# Patient Record
Sex: MALE | Race: WHITE | URBAN - NONMETROPOLITAN AREA
[De-identification: names, ages, dates, MRNs, and addresses within clinical notes are randomized per-mention and may not be internally consistent; named-entity substitution may affect disease eponyms.]

---

## 2017-12-14 LAB
ALBUMIN SERPL-MCNC: 4.5 G/DL
ALP BLD-CCNC: 59 U/L
ALT SERPL-CCNC: 19 U/L
ANION GAP SERPL CALCULATED.3IONS-SCNC: 15 MMOL/L
AST SERPL-CCNC: 28 U/L
BASOPHILS ABSOLUTE: NORMAL /ΜL
BASOPHILS RELATIVE PERCENT: NORMAL %
BILIRUB SERPL-MCNC: 0.7 MG/DL (ref 0.1–1.4)
BUN BLDV-MCNC: 19 MG/DL
CALCIUM SERPL-MCNC: 9.5 MG/DL
CHLORIDE BLD-SCNC: 102 MMOL/L
CHOLESTEROL, TOTAL: 181 MG/DL
CHOLESTEROL/HDL RATIO: 2.7
CO2: 27 MMOL/L
CREAT SERPL-MCNC: 1 MG/DL
EOSINOPHILS ABSOLUTE: NORMAL /ΜL
EOSINOPHILS RELATIVE PERCENT: NORMAL %
GFR CALCULATED: 91
GLUCOSE BLD-MCNC: 91 MG/DL
HCT VFR BLD CALC: 49.9 % (ref 41–53)
HDLC SERPL-MCNC: 67 MG/DL (ref 35–70)
HEMOGLOBIN: 17 G/DL (ref 13.5–17.5)
LDL CHOLESTEROL CALCULATED: 95 MG/DL (ref 0–160)
LYMPHOCYTES ABSOLUTE: NORMAL /ΜL
LYMPHOCYTES RELATIVE PERCENT: NORMAL %
MCH RBC QN AUTO: NORMAL PG
MCHC RBC AUTO-ENTMCNC: NORMAL G/DL
MCV RBC AUTO: NORMAL FL
MONOCYTES ABSOLUTE: NORMAL /ΜL
MONOCYTES RELATIVE PERCENT: NORMAL %
NEUTROPHILS ABSOLUTE: NORMAL /ΜL
NEUTROPHILS RELATIVE PERCENT: NORMAL %
PLATELET # BLD: NORMAL K/ΜL
PMV BLD AUTO: NORMAL FL
POTASSIUM SERPL-SCNC: 4.3 MMOL/L
RBC # BLD: 5.49 10^6/ΜL
SODIUM BLD-SCNC: 140 MMOL/L
TOTAL PROTEIN: 6.8
TRIGL SERPL-MCNC: 94 MG/DL
VLDLC SERPL CALC-MCNC: 19 MG/DL
WBC # BLD: 6.2 10^3/ML

## 2017-12-20 ENCOUNTER — OFFICE VISIT (OUTPATIENT)
Dept: NEPHROLOGY | Age: 62
End: 2017-12-20
Payer: COMMERCIAL

## 2017-12-20 VITALS
SYSTOLIC BLOOD PRESSURE: 138 MMHG | HEIGHT: 66 IN | OXYGEN SATURATION: 94 % | WEIGHT: 215 LBS | DIASTOLIC BLOOD PRESSURE: 98 MMHG | HEART RATE: 79 BPM | RESPIRATION RATE: 18 BRPM | BODY MASS INDEX: 34.55 KG/M2

## 2017-12-20 DIAGNOSIS — N02.8 IGA NEPHROPATHY: ICD-10-CM

## 2017-12-20 DIAGNOSIS — N18.2 CKD (CHRONIC KIDNEY DISEASE), STAGE II: Primary | ICD-10-CM

## 2017-12-20 PROCEDURE — G8484 FLU IMMUNIZE NO ADMIN: HCPCS | Performed by: INTERNAL MEDICINE

## 2017-12-20 PROCEDURE — 3017F COLORECTAL CA SCREEN DOC REV: CPT | Performed by: INTERNAL MEDICINE

## 2017-12-20 PROCEDURE — G8417 CALC BMI ABV UP PARAM F/U: HCPCS | Performed by: INTERNAL MEDICINE

## 2017-12-20 PROCEDURE — 1036F TOBACCO NON-USER: CPT | Performed by: INTERNAL MEDICINE

## 2017-12-20 PROCEDURE — 99213 OFFICE O/P EST LOW 20 MIN: CPT | Performed by: INTERNAL MEDICINE

## 2017-12-20 PROCEDURE — G8427 DOCREV CUR MEDS BY ELIG CLIN: HCPCS | Performed by: INTERNAL MEDICINE

## 2017-12-20 NOTE — PROGRESS NOTES
[facility]    [address] [facility]  [address], [address]  [phone]       Progress Note    12/20/2017 11:16 AM    Pt Name:    Jenny Pa  YOB: 1955  Primary Care Physician:  Osmar Aguilar MD       Chief Complaint:   Chief Complaint   Patient presents with    Chronic Kidney Disease     ii        History of Chief Complaint: CKD stage II from biopsy proven IgA nephropathy      Subjective:  I last saw the patient in clinic 12/21/16. I follow the patient for Chronic Kidney disease stage II. Since our last visit the patient has not been hospitalized. The patient is sleeping well at night with 1-2 times per night nocturia. The patient has a good appetite and is remaining active. The patient denied N/V/C/D/SOB/CP. EGFR=91 Ml/Min       Objective:  VITALS:  BP (!) 138/98 (Site: Left Arm, Position: Sitting, Cuff Size: Small Adult) Comment: PT. checks bp at home and it is fine there. Pulse 79   Resp 18   Ht 5' 6\" (1.676 m)   Wt 215 lb (97.5 kg)   SpO2 94%   BMI 34.70 kg/m²   Weight:   Wt Readings from Last 3 Encounters:   12/20/17 215 lb (97.5 kg)   12/21/16 200 lb (90.7 kg)   04/25/16 201 lb 12.8 oz (91.5 kg)     Body mass index is 34.7 kg/m². Physical examination    General:  Alert and cooperative with exam  HEENT:  Head: Normocephalic, no lesions, without obvious abnormality. Neck:   No JVD and no bruits. Thyroid gland is normal  Lungs:  clear to auscultation bilaterally  Heart:  regular rate and rhythm, S1, S2 normal, no murmur, click, rub or gallop  Abdomen:  soft, non-tender; bowel sounds normal; no masses,  no organomegaly  Extremities:  extremities normal, atraumatic, no cyanosis or edema  Neurologic:  Mental status: Alert, oriented, thought content appropriate  Skin:                Warm and dry with no rashes. Muscles:         Hand  and leg strength are equal and strong bilaterally.      Lab Data      CBC:   Lab Results   Component Value Date WBC 6.2 12/14/2017    HGB 17.0 12/14/2017    HCT 49.9 12/14/2017    MCV 93.3 08/15/2013    PLT 32.4 11/30/2015     BMP:    Lab Results   Component Value Date     12/14/2017     12/06/2016     12/01/2015    K 4.3 12/14/2017    K 4.4 12/06/2016    K 5.0 12/01/2015     12/14/2017     12/06/2016     12/01/2015    CO2 27 12/14/2017    CO2 27 12/06/2016    CO2 30 12/01/2015    BUN 19 12/14/2017    BUN 25 12/06/2016    BUN 24 12/01/2015    CREATININE 1.0 12/14/2017    CREATININE 0.9 12/06/2016    CREATININE 1.0 12/01/2015    GLUCOSE 91 12/14/2017    GLUCOSE 102 12/06/2016    GLUCOSE 92 12/01/2015      Hepatic:   Lab Results   Component Value Date    AST 28 12/14/2017    ALT 19 12/14/2017    BILITOT 0.7 12/14/2017    ALKPHOS 59 12/14/2017     BNP: No results found for: BNP  Lipids:   Lab Results   Component Value Date    CHOL 181 12/14/2017    HDL 67 12/14/2017     INR: No results found for: INR  URINE: No results found for: NAUR, PROTUR  No results found for: NITRU, COLORU, PHUR, LABCAST, WBCUA, RBCUA, MUCUS, TRICHOMONAS, YEAST, BACTERIA, CLARITYU, SPECGRAV, LEUKOCYTESUR, UROBILINOGEN, BILIRUBINUR, BLOODU, GLUCOSEU, KETUA, AMORPHOUS   Microalbumen/Creatinine ratio:  No components found for: RUCREAT        Medications:    Current Outpatient Prescriptions   Medication Sig Dispense Refill    aspirin 81 MG tablet Take 81 mg by mouth daily      nabumetone (RELAFEN) 500 MG tablet Take 500 mg by mouth 2 times daily as needed for Pain      Fish Oil OIL by Does not apply route daily      balsalazide (COLAZAL) 750 MG capsule Take 750 mg by mouth 3 tablets twice daily      lovastatin (MEVACOR) 20 MG tablet Take 20 mg by mouth nightly.  therapeutic multivitamin-minerals (THERAGRAN-M) tablet Take 1 tablet by mouth daily.  lisinopril-hydrochlorothiazide (PRINZIDE;ZESTORETIC) 20-25 MG per tablet Take 1 tablet by mouth daily.        No current facility-administered medications for this visit.            IMPRESSIONS:      1.  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 IgA nephropathy  3. Proteinuia  4.  hematuria         PLAN:  1. We discussed the eGFR today. 2. We will continue all current medications without changes. 3. We will see the patient back in 12 months.               _________________________________  Ankit Pat.  [facility]      Candace Messina MD

## 2018-12-04 LAB
ALBUMIN SERPL-MCNC: 4.6 G/DL
ALP BLD-CCNC: 58 U/L
ALT SERPL-CCNC: 22 U/L
ANION GAP SERPL CALCULATED.3IONS-SCNC: 12 MMOL/L
AST SERPL-CCNC: 26 U/L
BASOPHILS ABSOLUTE: NORMAL /ΜL
BASOPHILS RELATIVE PERCENT: NORMAL %
BILIRUB SERPL-MCNC: 1 MG/DL (ref 0.1–1.4)
BUN BLDV-MCNC: 26 MG/DL
CALCIUM SERPL-MCNC: 9.8 MG/DL
CHLORIDE BLD-SCNC: 104 MMOL/L
CO2: 27 MMOL/L
CREAT SERPL-MCNC: 0.9 MG/DL
EOSINOPHILS ABSOLUTE: NORMAL /ΜL
EOSINOPHILS RELATIVE PERCENT: NORMAL %
GFR CALCULATED: 89.3
GLUCOSE BLD-MCNC: 103 MG/DL
HCT VFR BLD CALC: 48.5 % (ref 41–53)
HEMOGLOBIN: 17 G/DL (ref 13.5–17.5)
LYMPHOCYTES ABSOLUTE: NORMAL /ΜL
LYMPHOCYTES RELATIVE PERCENT: NORMAL %
MCH RBC QN AUTO: NORMAL PG
MCHC RBC AUTO-ENTMCNC: NORMAL G/DL
MCV RBC AUTO: NORMAL FL
MONOCYTES ABSOLUTE: NORMAL /ΜL
MONOCYTES RELATIVE PERCENT: NORMAL %
NEUTROPHILS ABSOLUTE: NORMAL /ΜL
NEUTROPHILS RELATIVE PERCENT: NORMAL %
PLATELET # BLD: 238 K/ΜL
PMV BLD AUTO: NORMAL FL
POTASSIUM SERPL-SCNC: 4.8 MMOL/L
RBC # BLD: 5.41 10^6/ΜL
SODIUM BLD-SCNC: 143 MMOL/L
T4 FREE: 1.45
TOTAL PROTEIN: 7.2
WBC # BLD: 5.2 10^3/ML

## 2018-12-19 ENCOUNTER — OFFICE VISIT (OUTPATIENT)
Dept: NEPHROLOGY | Age: 63
End: 2018-12-19
Payer: COMMERCIAL

## 2018-12-19 VITALS
SYSTOLIC BLOOD PRESSURE: 132 MMHG | RESPIRATION RATE: 18 BRPM | WEIGHT: 213 LBS | BODY MASS INDEX: 34.23 KG/M2 | HEART RATE: 63 BPM | OXYGEN SATURATION: 95 % | HEIGHT: 66 IN | DIASTOLIC BLOOD PRESSURE: 84 MMHG

## 2018-12-19 DIAGNOSIS — N18.30 CKD (CHRONIC KIDNEY DISEASE), STAGE III (HCC): Primary | ICD-10-CM

## 2018-12-19 PROCEDURE — 1036F TOBACCO NON-USER: CPT | Performed by: INTERNAL MEDICINE

## 2018-12-19 PROCEDURE — G8427 DOCREV CUR MEDS BY ELIG CLIN: HCPCS | Performed by: INTERNAL MEDICINE

## 2018-12-19 PROCEDURE — 1101F PT FALLS ASSESS-DOCD LE1/YR: CPT | Performed by: INTERNAL MEDICINE

## 2018-12-19 PROCEDURE — G8484 FLU IMMUNIZE NO ADMIN: HCPCS | Performed by: INTERNAL MEDICINE

## 2018-12-19 PROCEDURE — 1123F ACP DISCUSS/DSCN MKR DOCD: CPT | Performed by: INTERNAL MEDICINE

## 2018-12-19 PROCEDURE — 4040F PNEUMOC VAC/ADMIN/RCVD: CPT | Performed by: INTERNAL MEDICINE

## 2018-12-19 PROCEDURE — 3017F COLORECTAL CA SCREEN DOC REV: CPT | Performed by: INTERNAL MEDICINE

## 2018-12-19 PROCEDURE — 99213 OFFICE O/P EST LOW 20 MIN: CPT | Performed by: INTERNAL MEDICINE

## 2018-12-19 PROCEDURE — G8417 CALC BMI ABV UP PARAM F/U: HCPCS | Performed by: INTERNAL MEDICINE

## 2018-12-19 RX ORDER — TRAMADOL HYDROCHLORIDE 50 MG/1
50 TABLET ORAL PRN
COMMUNITY
Start: 2018-11-26 | End: 2021-04-05 | Stop reason: ALTCHOICE

## 2018-12-19 RX ORDER — MELOXICAM 7.5 MG/1
7.5 TABLET ORAL DAILY
COMMUNITY
Start: 2018-12-10 | End: 2021-04-05 | Stop reason: ALTCHOICE

## 2019-11-26 LAB
ABSOLUTE BASO #: 0.1 X10E9/L (ref 0–0.9)
ABSOLUTE EOS #: 0.3 X10E9/L (ref 0–0.4)
ABSOLUTE LYMPH #: 1.8 X10E9/L (ref 1–3.5)
ABSOLUTE MONO #: 0.7 X10E9/L (ref 0–0.9)
ABSOLUTE NEUT #: 4 X10E9/L (ref 1.5–6.6)
ANION GAP SERPL CALCULATED.3IONS-SCNC: 17 MMOL/L (ref 4–12)
BASOPHILS RELATIVE PERCENT: 0.9 %
BUN BLDV-MCNC: 19 MG/DL (ref 5–27)
CALCIUM SERPL-MCNC: 9.7 MG/DL (ref 8.5–10.5)
CHLORIDE BLD-SCNC: 101 MMOL/L (ref 98–109)
CO2: 24 MMOL/L (ref 22–32)
CREAT SERPL-MCNC: 0.98 MG/DL (ref 0.6–1.3)
EGFR AFRICAN AMERICAN: >60 ML/MIN/1.73SQ.M
EGFR IF NONAFRICAN AMERICAN: >60 ML/MIN/1.73SQ.M
EOSINOPHILS RELATIVE PERCENT: 3.7 %
GLUCOSE: 91 MG/DL (ref 65–99)
HCT VFR BLD CALC: 51.8 % (ref 37–51)
HEMOGLOBIN: 17.4 G/DL (ref 12.6–17.4)
LYMPHOCYTE %: 27.1 %
MCH RBC QN AUTO: 31.5 PG (ref 27–35)
MCHC RBC AUTO-ENTMCNC: 33.6 G/DL (ref 31–36)
MCV RBC AUTO: 94 FL (ref 81–101)
MONOCYTES # BLD: 9.7 %
NEUTROPHILS RELATIVE PERCENT: 58.6 %
PDW BLD-RTO: 14 % (ref 11.4–14.3)
PLATELETS: 317 X10E9/L (ref 150–450)
PMV BLD AUTO: 7.3 FL (ref 7–12)
POTASSIUM SERPL-SCNC: 4.2 MMOL/L (ref 3.5–5)
RBC: 5.54 X10E12/L (ref 3.9–5.8)
SODIUM BLD-SCNC: 142 MMOL/L (ref 134–146)
WBC: 6.8 X10E9/L (ref 4.4–12)

## 2019-12-18 ENCOUNTER — OFFICE VISIT (OUTPATIENT)
Dept: NEPHROLOGY | Age: 64
End: 2019-12-18
Payer: COMMERCIAL

## 2019-12-18 VITALS
WEIGHT: 225 LBS | SYSTOLIC BLOOD PRESSURE: 130 MMHG | OXYGEN SATURATION: 93 % | HEIGHT: 66 IN | RESPIRATION RATE: 18 BRPM | HEART RATE: 70 BPM | DIASTOLIC BLOOD PRESSURE: 80 MMHG | BODY MASS INDEX: 36.16 KG/M2

## 2019-12-18 DIAGNOSIS — N18.2 CKD (CHRONIC KIDNEY DISEASE), STAGE II: Primary | ICD-10-CM

## 2019-12-18 DIAGNOSIS — D80.2 IGA DEFICIENCY (HCC): ICD-10-CM

## 2019-12-18 PROCEDURE — G8427 DOCREV CUR MEDS BY ELIG CLIN: HCPCS | Performed by: INTERNAL MEDICINE

## 2019-12-18 PROCEDURE — 1036F TOBACCO NON-USER: CPT | Performed by: INTERNAL MEDICINE

## 2019-12-18 PROCEDURE — G8484 FLU IMMUNIZE NO ADMIN: HCPCS | Performed by: INTERNAL MEDICINE

## 2019-12-18 PROCEDURE — G8417 CALC BMI ABV UP PARAM F/U: HCPCS | Performed by: INTERNAL MEDICINE

## 2019-12-18 PROCEDURE — 3017F COLORECTAL CA SCREEN DOC REV: CPT | Performed by: INTERNAL MEDICINE

## 2019-12-18 PROCEDURE — 1123F ACP DISCUSS/DSCN MKR DOCD: CPT | Performed by: INTERNAL MEDICINE

## 2019-12-18 PROCEDURE — 99213 OFFICE O/P EST LOW 20 MIN: CPT | Performed by: INTERNAL MEDICINE

## 2019-12-18 PROCEDURE — 4040F PNEUMOC VAC/ADMIN/RCVD: CPT | Performed by: INTERNAL MEDICINE

## 2020-12-10 LAB
ALBUMIN SERPL-MCNC: 4.4 G/DL
ALP BLD-CCNC: 70 U/L
ALT SERPL-CCNC: 17 U/L
ANION GAP SERPL CALCULATED.3IONS-SCNC: 10 MMOL/L
AST SERPL-CCNC: 24 U/L
BASOPHILS ABSOLUTE: NORMAL
BASOPHILS RELATIVE PERCENT: NORMAL
BILIRUB SERPL-MCNC: 0.7 MG/DL (ref 0.1–1.4)
BUN BLDV-MCNC: 20 MG/DL
CALCIUM SERPL-MCNC: 9.7 MG/DL
CHLORIDE BLD-SCNC: 102 MMOL/L
CO2: 28 MMOL/L
CREAT SERPL-MCNC: 0.87 MG/DL
EOSINOPHILS ABSOLUTE: NORMAL
EOSINOPHILS RELATIVE PERCENT: NORMAL
GFR CALCULATED: 60
GLUCOSE BLD-MCNC: 100 MG/DL
HCT VFR BLD CALC: 51.2 % (ref 41–53)
HEMOGLOBIN: 16.9 G/DL (ref 13.5–17.5)
LYMPHOCYTES ABSOLUTE: NORMAL
LYMPHOCYTES RELATIVE PERCENT: NORMAL
MCH RBC QN AUTO: NORMAL PG
MCHC RBC AUTO-ENTMCNC: NORMAL G/DL
MCV RBC AUTO: NORMAL FL
MONOCYTES ABSOLUTE: NORMAL
MONOCYTES RELATIVE PERCENT: NORMAL
NEUTROPHILS ABSOLUTE: NORMAL
NEUTROPHILS RELATIVE PERCENT: NORMAL
PLATELET # BLD: 255 K/ΜL
PMV BLD AUTO: NORMAL FL
POTASSIUM SERPL-SCNC: 4.6 MMOL/L
RBC # BLD: 5.46 10^6/ΜL
SODIUM BLD-SCNC: 140 MMOL/L
TOTAL PROTEIN: 7.1
WBC # BLD: 5.6 10^3/ML

## 2020-12-14 LAB
BILIRUBIN, URINE: NEGATIVE
BLOOD, URINE: NEGATIVE
CLARITY: CLEAR
COLOR: YELLOW
GLUCOSE URINE: NEGATIVE
KETONES, URINE: NEGATIVE
LEUKOCYTE ESTERASE, URINE: NEGATIVE
NITRITE, URINE: NEGATIVE
PH UA: 6.5 (ref 4.5–8)
PROTEIN UA: NEGATIVE
SPECIFIC GRAVITY, URINE: 1.02
UROBILINOGEN, URINE: NORMAL

## 2021-01-04 ENCOUNTER — OFFICE VISIT (OUTPATIENT)
Dept: NEPHROLOGY | Age: 66
End: 2021-01-04
Payer: COMMERCIAL

## 2021-01-04 VITALS
HEIGHT: 66 IN | RESPIRATION RATE: 18 BRPM | BODY MASS INDEX: 34.55 KG/M2 | WEIGHT: 215 LBS | SYSTOLIC BLOOD PRESSURE: 158 MMHG | DIASTOLIC BLOOD PRESSURE: 88 MMHG

## 2021-01-04 DIAGNOSIS — N18.2 CKD (CHRONIC KIDNEY DISEASE), STAGE II: Primary | ICD-10-CM

## 2021-01-04 PROBLEM — I78.1 NEVUS, NON-NEOPLASTIC: Status: ACTIVE | Noted: 2017-02-01

## 2021-01-04 PROBLEM — L72.3 SEBACEOUS CYST: Status: ACTIVE | Noted: 2017-02-01

## 2021-01-04 PROBLEM — L57.0 ACTINIC KERATOSIS: Status: ACTIVE | Noted: 2017-02-01

## 2021-01-04 PROCEDURE — G8417 CALC BMI ABV UP PARAM F/U: HCPCS | Performed by: INTERNAL MEDICINE

## 2021-01-04 PROCEDURE — G8482 FLU IMMUNIZE ORDER/ADMIN: HCPCS | Performed by: INTERNAL MEDICINE

## 2021-01-04 PROCEDURE — 1123F ACP DISCUSS/DSCN MKR DOCD: CPT | Performed by: INTERNAL MEDICINE

## 2021-01-04 PROCEDURE — G8428 CUR MEDS NOT DOCUMENT: HCPCS | Performed by: INTERNAL MEDICINE

## 2021-01-04 PROCEDURE — 99214 OFFICE O/P EST MOD 30 MIN: CPT | Performed by: INTERNAL MEDICINE

## 2021-01-04 PROCEDURE — 4040F PNEUMOC VAC/ADMIN/RCVD: CPT | Performed by: INTERNAL MEDICINE

## 2021-01-04 PROCEDURE — 3017F COLORECTAL CA SCREEN DOC REV: CPT | Performed by: INTERNAL MEDICINE

## 2021-01-04 PROCEDURE — 1036F TOBACCO NON-USER: CPT | Performed by: INTERNAL MEDICINE

## 2021-01-04 NOTE — PATIENT INSTRUCTIONS
KNOW YOUR KIDNEY NUMBERS    Your kidney speed (eGFR) was 60 ml/min this visit (normal is  ml/min)(Ml/min=milliliters of blood filtered per minute). The higher this number is, the better your kidney function is. Your serum creatinine was 0.87 (normal 0.8-1.2 mg/dl at most labs). The higher this number is, the worse your kidney function is. You are in stage G-II-A1 of chronic kidney disease. Your kidney function has declined as compared to your last visit. Your last eGFR was  90 Ml/Min. Stages of kidney disease    EGFR (estimated glomerular filtration rate)    G-I > 90 ml/min Kidney damage with normal kidney function (blood or protein in the urine)  G-II 60-89 ml/min Normal kidney function with mild damage with or without blood or protein in the urine  G-IIIA 45-59 ml/min Mild to moderate loss of kidney function. G-IIIB 30-44 ml/min Moderate to severe loss of kidney function  G-IV 15-29 ml/min Severe loss of kidney function  G-V < 15 mlmin     May need dialysis or kidney transplant    ACR (urine albumin/creatinine ratio) (Mg/Gm)    A-1      ACR<30 Normal to mildly increased protein in the urine. A-2 ACR  Moderate increase in urine protein loss. A-3 ACR >300 Severe increase in urine protein loss    Our goal is to keep your eGFR going as fast as possible ( ml/min is normal). If your eGFR declines to 15-24 ml/min and stays there without recovery,  we will begin to educate you about dialysis or kidney transplant. We also want to keep the protein in your urine as low as possible. The leading cause of kidney disease in the world is diabetes mellitus. Keep your sugar  as much as possible. The second leading cause is hypertension. Keep Your blood pressure 120-140/70-80 as much as possible. If you need refills, call the office or your drug store. You may call the office any time with any questions or concerns. DUE TO THE CORONAVIRUS CONCERN, PLEASE LIMIT YOUR TIME IN PUBLIC. 8 Virgen Recinosidi YOUR HANDS COMPLETELY AND FREQUENTLY. Burgess Tisha Penaloza Nip

## 2021-01-04 NOTE — PROGRESS NOTES
[facility]    [address] [facility] [address]  [address], [address]  [phone]       Progress Note    1/4/2021 1:16 PM    Pt Name:    Katie Doss:    1955  Primary Care Physician:  Nic Mooney MD       Chief Complaint:   Chief Complaint   Patient presents with    Chronic Kidney Disease     biopsy proven IgA since 2013    Nephritis    Proteinuria        History of Chief Complaint: CKD stage G-II-A1 from biopsy proven IgA nephropathy. I first saw him 06/13/13. Subjective:  I last saw the patient in clinic 12/18/2019. I follow the patient for Chronic Kidney disease stage G-II-A1. Since our last visit the patient has not been hospitalized. The patient is sleeping well at night with 1-2 times per night nocturia. The patient has a good appetite and is remaining active. The patient denied N/V/C/D/SOB/CP. He has no trouble at bladder emptying. His urinalysis was normal.     COVID-19 screening  Fever: none  Cough: none  Exposure: none  Shortness of breath: none    Protein/creatinine ratio: 0.07  eGFR: >60 Ml/min      Last six eGFR readings:  Lab Results   Component Value Date    LABGLOM 60 12/10/2020    LABGLOM 89.3 12/04/2018    LABGLOM 91 12/14/2017    LABGLOM 103 12/06/2016    LABGLOM 76 12/01/2015          Objective:  VITALS:  BP (!) 158/88 (Site: Left Upper Arm, Position: Sitting, Cuff Size: Large Adult)   Resp 18   Ht 5' 6\" (1.676 m)   Wt 215 lb (97.5 kg)   BMI 34.70 kg/m²   Weight:   Wt Readings from Last 3 Encounters:   01/04/21 215 lb (97.5 kg)   12/18/19 225 lb (102.1 kg)   12/19/18 213 lb (96.6 kg)     Body mass index is 34.7 kg/m². Physical examination    General:  Alert and cooperative with exam  HEENT:  Normocephalic. No lesions nor rashes. YUKO. EOMI  Neck:   No JVD and no bruits. Thyroid gland is normal  Lungs:  Breathing easily. No rales nor rhonchi. No cough nor sputum production. Heart[de-identified]            RRR. No murmurs nor rubs. PMI is not enlarged nor displaced. Abdomen:  Soft and non tender. Bowel sounds are active in all four quadrants. Extremities:  no edema  Neurologic:  CN II-XII are intact. No deficits noted. Muscle strength and tone are equal throughout. Skin:                Warm and dry with no rashes. Muscles:         Hand  and leg strength are equal and strong bilaterally.      Lab Data      CBC:   Lab Results   Component Value Date    WBC 5.6 12/10/2020    HGB 16.9 12/10/2020    HCT 51.2 12/10/2020    MCV 94 11/25/2019     12/10/2020     BMP:    Lab Results   Component Value Date     12/10/2020     11/25/2019     12/04/2018    K 4.6 12/10/2020    K 4.2 11/25/2019    K 4.8 12/04/2018     12/10/2020     11/25/2019     12/04/2018    CO2 28 12/10/2020    CO2 24 11/25/2019    CO2 27 12/04/2018    BUN 20 12/10/2020    BUN 19 11/25/2019    BUN 26 12/04/2018    CREATININE 0.87 12/10/2020    CREATININE 0.98 11/25/2019    CREATININE 0.9 12/04/2018    GLUCOSE 100 12/10/2020    GLUCOSE 91 11/25/2019    GLUCOSE 103 12/04/2018      Hepatic:   Lab Results   Component Value Date    AST 24 12/10/2020    AST 26 12/04/2018    AST 28 12/14/2017    ALT 17 12/10/2020    ALT 22 12/04/2018    ALT 19 12/14/2017    BILITOT 0.7 12/10/2020    BILITOT 1.0 12/04/2018    BILITOT 0.7 12/14/2017    ALKPHOS 70 12/10/2020    ALKPHOS 58 12/04/2018    ALKPHOS 59 12/14/2017     BNP: No results found for: BNP  Lipids:   Lab Results   Component Value Date    CHOL 181 12/14/2017    HDL 67 12/14/2017     INR: No results found for: INR  URINE: No results found for: NAUR, PROTUR  Lab Results   Component Value Date    NITRU Negative 12/14/2020    COLORU Yellow 12/14/2020    PHUR 6.5 12/14/2020    CLARITYU Clear 12/14/2020    LEUKOCYTESUR Negative 12/14/2020    UROBILINOGEN  12/14/2020      Comment:      <1.1    BILIRUBINUR Negative 12/14/2020    BLOODU Negative 12/14/2020 GLUCOSEU negative 12/14/2020    KETUA Negative 12/14/2020      Microalbumen/Creatinine ratio:  No components found for: RUCREAT        Medications:    Current Outpatient Medications   Medication Sig Dispense Refill    meloxicam (MOBIC) 7.5 MG tablet 7.5 mg daily      traMADol (ULTRAM) 50 MG tablet 50 mg as needed. [place] aspirin 81 MG tablet Take 81 mg by mouth daily      Fish Oil OIL by Does not apply route daily      balsalazide (COLAZAL) 750 MG capsule Take 750 mg by mouth 3 tablets twice daily      lovastatin (MEVACOR) 20 MG tablet Take 20 mg by mouth nightly.  therapeutic multivitamin-minerals (THERAGRAN-M) tablet Take 1 tablet by mouth daily.  lisinopril-hydrochlorothiazide (PRINZIDE;ZESTORETIC) 20-25 MG per tablet Take 1 tablet by mouth daily. No current facility-administered medications for this visit. IMPRESSIONS:      Kidney disease: CKD stage G-II-A1 from biopsy proven IgA nephropathy since 2013  Anemia:  Bone and mineral metabolism:       PLAN:  1. We discussed the eGFR today. 2. We will continue all current medications without changes. 3. Checking protein/creatinine ratio. 4. Urinalysis  5. Since his eGFR dropped from 90-60 ml/min since last visit, I will see him back in 3 months. 6. We will see the patient back in 3 months.       _________________________________  Danny Flannery.  [facility]      Dany Sanchez MD

## 2021-03-24 LAB
BASOPHILS ABSOLUTE: 0.05 /ΜL
BASOPHILS RELATIVE PERCENT: 0.9 %
BUN BLDV-MCNC: 16 MG/DL
CALCIUM SERPL-MCNC: 9.1 MG/DL
CHLORIDE BLD-SCNC: 108 MMOL/L
CO2: 29 MMOL/L
CREAT SERPL-MCNC: 0.92 MG/DL
EOSINOPHILS ABSOLUTE: 0.27 /ΜL
EOSINOPHILS RELATIVE PERCENT: 5 %
GFR CALCULATED: 86
GLUCOSE BLD-MCNC: 113 MG/DL
HCT VFR BLD CALC: 49.6 % (ref 41–53)
HEMOGLOBIN: 16.7 G/DL (ref 13.5–17.5)
LYMPHOCYTES ABSOLUTE: 1.51 /ΜL
LYMPHOCYTES RELATIVE PERCENT: 27.7 %
MCH RBC QN AUTO: 31.1 PG
MCHC RBC AUTO-ENTMCNC: 33.7 G/DL
MCV RBC AUTO: 92.4 FL
MONOCYTES ABSOLUTE: 0.63 /ΜL
MONOCYTES RELATIVE PERCENT: 11.6 %
NEUTROPHILS ABSOLUTE: 2.97 /ΜL
NEUTROPHILS RELATIVE PERCENT: 54.4 %
PLATELET # BLD: 228 K/ΜL
PMV BLD AUTO: 8.5 FL
POTASSIUM SERPL-SCNC: 4.4 MMOL/L
RBC # BLD: 5.37 10^6/ΜL
SODIUM BLD-SCNC: 141 MMOL/L
WBC # BLD: 5.45 10^3/ML

## 2021-04-05 ENCOUNTER — OFFICE VISIT (OUTPATIENT)
Dept: NEPHROLOGY | Age: 66
End: 2021-04-05
Payer: COMMERCIAL

## 2021-04-05 VITALS
BODY MASS INDEX: 35.36 KG/M2 | WEIGHT: 220 LBS | HEIGHT: 66 IN | DIASTOLIC BLOOD PRESSURE: 84 MMHG | OXYGEN SATURATION: 95 % | RESPIRATION RATE: 18 BRPM | SYSTOLIC BLOOD PRESSURE: 120 MMHG | HEART RATE: 80 BPM

## 2021-04-05 DIAGNOSIS — N02.8 IGA NEPHROPATHY DETERMINED BY BIOPSY OF KIDNEY: ICD-10-CM

## 2021-04-05 DIAGNOSIS — N18.2 CKD (CHRONIC KIDNEY DISEASE), STAGE II: Primary | ICD-10-CM

## 2021-04-05 PROCEDURE — 99214 OFFICE O/P EST MOD 30 MIN: CPT | Performed by: INTERNAL MEDICINE

## 2021-04-05 PROCEDURE — 4040F PNEUMOC VAC/ADMIN/RCVD: CPT | Performed by: INTERNAL MEDICINE

## 2021-04-05 PROCEDURE — 1123F ACP DISCUSS/DSCN MKR DOCD: CPT | Performed by: INTERNAL MEDICINE

## 2021-04-05 PROCEDURE — 1036F TOBACCO NON-USER: CPT | Performed by: INTERNAL MEDICINE

## 2021-04-05 PROCEDURE — G8417 CALC BMI ABV UP PARAM F/U: HCPCS | Performed by: INTERNAL MEDICINE

## 2021-04-05 PROCEDURE — G8427 DOCREV CUR MEDS BY ELIG CLIN: HCPCS | Performed by: INTERNAL MEDICINE

## 2021-04-05 PROCEDURE — 3017F COLORECTAL CA SCREEN DOC REV: CPT | Performed by: INTERNAL MEDICINE

## 2021-04-05 RX ORDER — SULINDAC 150 MG/1
150 TABLET ORAL 2 TIMES DAILY
COMMUNITY

## 2021-04-05 NOTE — PROGRESS NOTES
[facility]    [address] [facility]  [address], [address]  [phone]       Progress Note    4/5/2021 1:24 PM    Pt Name:    Linda Henao  YOB: 1955  Primary Care Physician:  Bee Triana MD       Chief Complaint:   Chief Complaint   Patient presents with    Hypertension    Nephritis    Hematuria    Chronic Kidney Disease     biopsy proven IgA since 06/13/13        History of Chief Complaint: CKD stage G-II-A1 from biopsy proven IgA nephropathy. I first saw him 06/13/13. Subjective:  I last saw the patient in clinic 01/04/2021. I follow the patient for Chronic Kidney disease stage G-IIIA-A1. Since our last visit the patient has not been hospitalized. The patient is sleeping well at night with 0-1 times per night nocturia. The patient has a good appetite and is remaining active. The patient denied N/V/C/D/SOB/CP. He has no trouble at bladder emptying. COVID-19 screening  Fever: none  Cough: none  Exposure: none  Shortness of breath: none    Protein/creatinine ratio:   eGFR: 86 Ml/min (it was >60 Ml/Min last visit)  SCr: 0.92 Mg/Dl (It was 0.87 Mg/Dl last visit)      Last six eGFR readings:  Lab Results   Component Value Date    LABGLOM 86 03/24/2021    LABGLOM 60 12/10/2020    LABGLOM 89.3 12/04/2018    LABGLOM 91 12/14/2017    LABGLOM 103 12/06/2016    LABGLOM 76 12/01/2015          Objective:  VITALS:  /84 (Site: Left Upper Arm, Position: Sitting, Cuff Size: Large Adult)   Pulse 80   Resp 18   Ht 5' 6\" (1.676 m)   Wt 220 lb (99.8 kg)   SpO2 95%   BMI 35.51 kg/m²   Weight:   Wt Readings from Last 3 Encounters:   04/05/21 220 lb (99.8 kg)   01/04/21 215 lb (97.5 kg)   12/18/19 225 lb (102.1 kg)     Body mass index is 35.51 kg/m². Physical examination    General:  Alert and cooperative with exam  HEENT:  Normocephalic. No lesions nor rashes. YUKO. EOMI  Neck:   No JVD and no bruits. Thyroid gland is normal  Lungs:  Breathing easily.  No rales

## 2021-11-18 LAB
ALBUMIN SERPL-MCNC: 4.3 G/DL
ALP BLD-CCNC: 66 U/L
ALT SERPL-CCNC: 18 U/L
ANION GAP SERPL CALCULATED.3IONS-SCNC: 10 MMOL/L
AST SERPL-CCNC: 23 U/L
BASOPHILS ABSOLUTE: NORMAL
BASOPHILS RELATIVE PERCENT: NORMAL
BILIRUB SERPL-MCNC: 1.3 MG/DL (ref 0.1–1.4)
BILIRUBIN, URINE: NEGATIVE
BLOOD, URINE: NEGATIVE
BUN BLDV-MCNC: 21 MG/DL
CALCIUM SERPL-MCNC: 9.5 MG/DL
CHLORIDE BLD-SCNC: 102 MMOL/L
CHOLESTEROL, TOTAL: 162 MG/DL
CHOLESTEROL/HDL RATIO: 2.8
CLARITY: CLEAR
CO2: 28 MMOL/L
COLOR: YELLOW
CREAT SERPL-MCNC: 1.01 MG/DL
CREATININE, URINE: 111.21
EOSINOPHILS ABSOLUTE: NORMAL
EOSINOPHILS RELATIVE PERCENT: NORMAL
GFR CALCULATED: >60
GLUCOSE BLD-MCNC: 105 MG/DL
GLUCOSE URINE: NEGATIVE
HCT VFR BLD CALC: 50 % (ref 41–53)
HDLC SERPL-MCNC: 58 MG/DL (ref 35–70)
HEMOGLOBIN: 17.1 G/DL (ref 13.5–17.5)
KETONES, URINE: NEGATIVE
LDL CHOLESTEROL CALCULATED: 75 MG/DL (ref 0–160)
LEUKOCYTE ESTERASE, URINE: NEGATIVE
LYMPHOCYTES ABSOLUTE: NORMAL
LYMPHOCYTES RELATIVE PERCENT: NORMAL
MCH RBC QN AUTO: NORMAL PG
MCHC RBC AUTO-ENTMCNC: NORMAL G/DL
MCV RBC AUTO: NORMAL FL
MICROALBUMIN/CREAT 24H UR: <0.7 MG/G{CREAT}
MICROALBUMIN/CREAT UR-RTO: NORMAL
MONOCYTES ABSOLUTE: NORMAL
MONOCYTES RELATIVE PERCENT: NORMAL
NEUTROPHILS ABSOLUTE: NORMAL
NEUTROPHILS RELATIVE PERCENT: NORMAL
NITRITE, URINE: NEGATIVE
NONHDLC SERPL-MCNC: NORMAL MG/DL
PH UA: 7 (ref 4.5–8)
PLATELET # BLD: 263 K/ΜL
PMV BLD AUTO: NORMAL FL
POTASSIUM SERPL-SCNC: 4.7 MMOL/L
PROTEIN UA: NEGATIVE
RBC # BLD: 5.35 10^6/ΜL
SODIUM BLD-SCNC: 140 MMOL/L
SPECIFIC GRAVITY, URINE: 1.02
TOTAL PROTEIN: 6.8
TRIGL SERPL-MCNC: 146 MG/DL
TSH SERPL DL<=0.05 MIU/L-ACNC: 1.02 UIU/ML
UROBILINOGEN, URINE: NORMAL
VLDLC SERPL CALC-MCNC: 29 MG/DL
WBC # BLD: 6 10^3/ML

## 2021-12-01 ENCOUNTER — OFFICE VISIT (OUTPATIENT)
Dept: NEPHROLOGY | Age: 66
End: 2021-12-01
Payer: COMMERCIAL

## 2021-12-01 VITALS
OXYGEN SATURATION: 98 % | SYSTOLIC BLOOD PRESSURE: 138 MMHG | DIASTOLIC BLOOD PRESSURE: 78 MMHG | BODY MASS INDEX: 35.51 KG/M2 | HEART RATE: 75 BPM | WEIGHT: 220 LBS

## 2021-12-01 DIAGNOSIS — N02.8 IGA NEPHROPATHY DETERMINED BY BIOPSY OF KIDNEY: Primary | ICD-10-CM

## 2021-12-01 DIAGNOSIS — I10 HTN (HYPERTENSION), BENIGN: ICD-10-CM

## 2021-12-01 DIAGNOSIS — N18.2 CKD (CHRONIC KIDNEY DISEASE), STAGE II: ICD-10-CM

## 2021-12-01 PROCEDURE — 1123F ACP DISCUSS/DSCN MKR DOCD: CPT | Performed by: INTERNAL MEDICINE

## 2021-12-01 PROCEDURE — 99213 OFFICE O/P EST LOW 20 MIN: CPT | Performed by: INTERNAL MEDICINE

## 2021-12-01 PROCEDURE — 4040F PNEUMOC VAC/ADMIN/RCVD: CPT | Performed by: INTERNAL MEDICINE

## 2021-12-01 PROCEDURE — G8484 FLU IMMUNIZE NO ADMIN: HCPCS | Performed by: INTERNAL MEDICINE

## 2021-12-01 PROCEDURE — G8417 CALC BMI ABV UP PARAM F/U: HCPCS | Performed by: INTERNAL MEDICINE

## 2021-12-01 PROCEDURE — 3017F COLORECTAL CA SCREEN DOC REV: CPT | Performed by: INTERNAL MEDICINE

## 2021-12-01 PROCEDURE — 1036F TOBACCO NON-USER: CPT | Performed by: INTERNAL MEDICINE

## 2021-12-01 PROCEDURE — G8427 DOCREV CUR MEDS BY ELIG CLIN: HCPCS | Performed by: INTERNAL MEDICINE

## 2021-12-01 NOTE — PROGRESS NOTES
[facility]        Outpatient Follow-Up note         12/1/2021 10:13 AM    Patient Name:   Rhett Sage  YOB: 1955  Primary Care Physician:  Tatum Gomez MD   [address] [address]  Dept: [phone]  Loc: [phone]     Chief Complaint / Reason for follow-up : Follow Up of HTN/CKD /IgA Nephropathy     Interval History :  Patient seen and examined by me. Feel well. No cp or SOB. No hospitalizations and no med changes. Used to see DR. Chandra     Past History :  Past Medical History:   Diagnosis Date    Arthritis, degenerative     Hematuria 6/3/2013    HTN (hypertension)     Hyperglycemia     Hyperlipemia     IgA nephropathy 10/7/2013    Dr. Juana Hess    Knee joint replacement status-left 5/1/1996    Knee joint replacement status-left 6/15/1999    S/P cholecystectomy 6/3/2013    Snoring     no apnea, no waking cough or choking, no daytime somnolence, no am headaches, BMI 31, neck circ 15 inches    Ulcerative proctitis, chronic ([address]     White coat hypertension 12/16/2015     Past Surgical History:   Procedure Laterality Date    CARPAL TUNNEL RELEASE Bilateral 2010 ? [address]?     [facility]    COLONOSCOPY  2011    KNEE ARTHROPLASTY Left 1996 & 1998    Revision done in [address] [place] [address] ARTHROSCOPY Left late 1980's - 1999 X 3    OTHER SURGICAL HISTORY Right 04/23/2016    I&D, Poly Exchange and Closure of Right Total Knee     TIBIA OSTEOTOMY  late 1980's - 1999        Medications :     Outpatient Medications Marked as Taking for the 12/1/21 encounter (Office Visit) with Benita Magallanes MD   Medication Sig Dispense Refill    sulindac (CLINORIL) 150 MG tablet Take 150 mg by mouth 2 times daily      aspirin 81 MG tablet Take 81 mg by mouth daily      Fish Oil OIL by Does not apply route daily      balsalazide (COLAZAL) 750 MG capsule Take 750 mg by mouth 3 tablets twice daily      lovastatin (MEVACOR) 20 MG tablet Take 20 mg by mouth nightly.  therapeutic multivitamin-minerals (THERAGRAN-M) tablet Take 1 tablet by mouth daily.  lisinopril-hydrochlorothiazide (PRINZIDE;ZESTORETIC) 20-25 MG per tablet Take 1 tablet by mouth daily. Vitals     /78 (Site: Right Upper Arm, Position: Sitting, Cuff Size: Medium Adult)   Pulse 75   Wt 220 lb (99.8 kg) Comment: per patient  SpO2 98%   BMI 35.51 kg/m²  Wt Readings from Last 3 Encounters:   12/01/21 220 lb (99.8 kg)   04/05/21 220 lb (99.8 kg)   01/04/21 215 lb (97.5 kg)        Physical Exam     General -- no distress  Lungs -- clear  Heart -- S1, S2 heard, JVD - no  Abdomen - soft, non-tender  Extremities -- no edema  CNS - awake and alert    Labs, Radiology and Tests    Labs -    11/21           Potassium 4.7           BUN 21           Creatinine 1.01           eGFR >60                       UPCR            UMCR < 30                         Renal Ultrasound Scan --2013  Right kidney 9.3 cm left kidney 10.5 cm     Renal biopsy 2013-IgA nephropathy mild interstitial fibrosis around 25%. Assessment    1. Renal -patient is CKD stage II mostly due to IgA nephropathy biopsy-proven. -No further intervention at this time as the renal function is maintaining stable no hematuria and no proteinuria  -However if any of these worsen in future may need a repeat biopsy  He is already on ACE inhibitor. 2. Essential hypertension running well continue current medications  3. Hx of biopsy-proven IgA nephropathy-on ACE  4. meds reviewed and D/W patient  5. Discussed that the patient follow-up in 1 year    Tests and orders placed this Encounter   No orders of the defined types were placed in this encounter. Mariangel Ornelas M.D  [facility].

## 2022-12-07 ENCOUNTER — OFFICE VISIT (OUTPATIENT)
Dept: NEPHROLOGY | Age: 67
End: 2022-12-07
Payer: COMMERCIAL

## 2022-12-07 VITALS
SYSTOLIC BLOOD PRESSURE: 142 MMHG | OXYGEN SATURATION: 95 % | HEART RATE: 65 BPM | WEIGHT: 231 LBS | DIASTOLIC BLOOD PRESSURE: 90 MMHG | BODY MASS INDEX: 37.28 KG/M2

## 2022-12-07 DIAGNOSIS — N02.8 IGA NEPHROPATHY DETERMINED BY BIOPSY OF KIDNEY: Primary | ICD-10-CM

## 2022-12-07 DIAGNOSIS — N18.2 CKD (CHRONIC KIDNEY DISEASE), STAGE II: ICD-10-CM

## 2022-12-07 DIAGNOSIS — I10 HTN (HYPERTENSION), BENIGN: ICD-10-CM

## 2022-12-07 PROCEDURE — G8417 CALC BMI ABV UP PARAM F/U: HCPCS | Performed by: INTERNAL MEDICINE

## 2022-12-07 PROCEDURE — 3017F COLORECTAL CA SCREEN DOC REV: CPT | Performed by: INTERNAL MEDICINE

## 2022-12-07 PROCEDURE — 1036F TOBACCO NON-USER: CPT | Performed by: INTERNAL MEDICINE

## 2022-12-07 PROCEDURE — 3074F SYST BP LT 130 MM HG: CPT | Performed by: INTERNAL MEDICINE

## 2022-12-07 PROCEDURE — G8427 DOCREV CUR MEDS BY ELIG CLIN: HCPCS | Performed by: INTERNAL MEDICINE

## 2022-12-07 PROCEDURE — 3078F DIAST BP <80 MM HG: CPT | Performed by: INTERNAL MEDICINE

## 2022-12-07 PROCEDURE — 99213 OFFICE O/P EST LOW 20 MIN: CPT | Performed by: INTERNAL MEDICINE

## 2022-12-07 PROCEDURE — G8484 FLU IMMUNIZE NO ADMIN: HCPCS | Performed by: INTERNAL MEDICINE

## 2022-12-07 PROCEDURE — 1123F ACP DISCUSS/DSCN MKR DOCD: CPT | Performed by: INTERNAL MEDICINE

## 2022-12-07 NOTE — PROGRESS NOTES
[facility]        Outpatient Follow-Up note         12/7/2022 11:40 AM    Patient Name:   Aurelio Delgadillo  YOB: 1955  Primary Care Physician:  Michelle Robin MD   [id number] [place] [address]  Dept: [phone]  Loc: [phone]     Chief Complaint / Reason for follow-up : Follow Up of HTN/CKD /IgA Nephropathy     Interval History :  Patient seen and examined by me. Feel well. No cp or SOB. No hospitalizations and no med changes. Past History :  Past Medical History:   Diagnosis Date    Arthritis, degenerative     Hematuria 6/3/2013    HTN (hypertension)     Hyperglycemia     Hyperlipemia     IgA nephropathy 10/7/2013    Dr. Brandon Coronado    Knee joint replacement status-left 5/1/1996    Knee joint replacement status-left 6/15/1999    S/P cholecystectomy 6/3/2013    Snoring     no apnea, no waking cough or choking, no daytime somnolence, no am headaches, BMI 31, neck circ 15 inches    Ulcerative proctitis, chronic (HCC)     White coat hypertension 12/16/2015     Past Surgical History:   Procedure Laterality Date    CARPAL TUNNEL RELEASE Bilateral 2010 ? CHOLECYSTECTOMY  1991?     [facility]    COLONOSCOPY  2011    KNEE ARTHROPLASTY Left 1996 & 1998    Revision done in [address] [place] [address] ARTHROSCOPY Left late 18's - 1999 X 3    OTHER SURGICAL HISTORY Right 04/23/2016    I&D, Poly Exchange and Closure of Right Total Knee     TIBIA OSTEOTOMY  late 1980's - 1999        Medications :     Outpatient Medications Marked as Taking for the 12/7/22 encounter (Office Visit) with Jenna Li MD   Medication Sig Dispense Refill    sulindac (CLINORIL) 150 MG tablet Take 150 mg by mouth daily      Fish Oil OIL by Does not apply route daily      balsalazide (COLAZAL) 750 MG capsule Take 750 mg by mouth 3 tablets three times daily      lovastatin (MEVACOR) 20 MG tablet Take 20 mg by mouth nightly. therapeutic multivitamin-minerals (THERAGRAN-M) tablet Take 1 tablet by mouth daily. lisinopril-hydrochlorothiazide (PRINZIDE;ZESTORETIC) 20-25 MG per tablet Take 1 tablet by mouth daily. Vitals     BP (!) 142/90 (Site: Left Upper Arm, Position: Sitting, Cuff Size: Medium Adult)   Pulse 65   Wt 231 lb (104.8 kg)   SpO2 95%   BMI 37.28 kg/m²  Wt Readings from Last 3 Encounters:   12/07/22 231 lb (104.8 kg)   12/01/21 220 lb (99.8 kg)   04/05/21 220 lb (99.8 kg)        Physical Exam     General -- no distress  Lungs -- clear  Heart -- S1, S2 heard, JVD - no  Abdomen - soft, non-tender  Extremities -- no edema  CNS - awake and alert    Labs, Radiology and Tests    Labs -    11/21 11/22          Potassium 4.7 3.9          BUN 21 18          Creatinine 1.01 0.97          eGFR >60 85                      UPCR  <100          UMCR < 30 5                        Renal Ultrasound Scan --2013  Right kidney 9.3 cm left kidney 10.5 cm     Renal biopsy 2013-IgA nephropathy mild interstitial fibrosis around 25%. Assessment    Renal -patient is CKD stage II mostly due to IgA nephropathy biopsy-proven. -No further intervention at this time as the renal function is maintaining stable no hematuria and no proteinuria  -However if any of these worsen in future may need a repeat biopsy  He is already on ACE inhibitor. Essential hypertension running well at home  continue current medications  Hx of biopsy-proven IgA nephropathy-on ACE  meds reviewed and D/W patient  Discussed that the patient follow-up in 1 year    Tests and orders placed this Encounter     Orders Placed This Encounter   Procedures    Basic Metabolic Panel    Microalbumin / Creatinine Urine Ratio    Protein / creatinine ratio, urine    Urinalysis with Microscopic         Rumaldo Schilder, M.D  [facility].